# Patient Record
Sex: MALE | Race: WHITE | NOT HISPANIC OR LATINO | Employment: FULL TIME | URBAN - METROPOLITAN AREA
[De-identification: names, ages, dates, MRNs, and addresses within clinical notes are randomized per-mention and may not be internally consistent; named-entity substitution may affect disease eponyms.]

---

## 2020-04-09 ENCOUNTER — TELEMEDICINE (OUTPATIENT)
Dept: FAMILY MEDICINE CLINIC | Facility: CLINIC | Age: 25
End: 2020-04-09

## 2020-04-09 DIAGNOSIS — J30.1 CHRONIC ALLERGIC RHINITIS DUE TO POLLEN: Primary | ICD-10-CM

## 2020-04-09 DIAGNOSIS — R00.2 PALPITATIONS: ICD-10-CM

## 2020-04-09 PROCEDURE — 99202 OFFICE O/P NEW SF 15 MIN: CPT | Performed by: FAMILY MEDICINE

## 2020-04-09 NOTE — PROGRESS NOTES
" VIDEO VISIT  Riley Martel is a 25 y.o. male.     Chief Complaint   Patient presents with   • Allergies       HPI     Visit to establish care and with concern for cough and sore throat. Was initially concerned that he might have coronavirus, but has since convinced himself that he's suffering from allergic rhinitis. No fevers or chills, no fatigue or malaise. No known sick contacts. Began taking an OTC antihistamine with some improvement in symptoms. Still having some rhinorrhea and post-nasal drip, but less so, and sore throat is resolved.     Additionally notes some episodic palpitations that have begun to occur over the past several weeks. Typically happens once every few days, notices a few \"stronger beats\", and then won't again for a while. Hasn't identified any consistent precipitating factors. Episodes never last long enough to need to try to intervene. Father has similar episodes. They are unaffected by exercise.     The following portions of the patient's history were reviewed and updated as appropriate: allergies, current medications, past family history, past medical history, past social history, past surgical history and problem list.    Review of Systems   Constitutional: Negative for chills, fatigue, fever and unexpected weight change.   HENT: Positive for postnasal drip, rhinorrhea and sore throat.    Respiratory: Positive for cough. Negative for shortness of breath and wheezing.    Cardiovascular: Positive for palpitations. Negative for chest pain and leg swelling.   Gastrointestinal: Negative for abdominal distention, abdominal pain, constipation, diarrhea, nausea and vomiting.   Genitourinary: Negative for dysuria.   Musculoskeletal: Negative for arthralgias and myalgias.   Skin: Negative for rash.   Neurological: Negative for dizziness.   Psychiatric/Behavioral: Negative for confusion.         Objective  There were no vitals filed for this visit.  There is no height or weight on file to " calculate BMI.    Physical Exam   Constitutional: He is oriented to person, place, and time. He appears well-developed and well-nourished. No distress.   HENT:   Head: Normocephalic.   Right Ear: External ear normal.   Left Ear: External ear normal.   Nose: Nose normal.   Eyes: Pupils are equal, round, and reactive to light. Conjunctivae and EOM are normal.   Neurological: He is alert and oriented to person, place, and time.   Skin: Skin is dry.   Psychiatric: He has a normal mood and affect. His behavior is normal.       No current outpatient medications on file.  Current outpatient and discharge medications have been reconciled for the patient.  Reviewed by: Wilner Win MD      Procedures    Lab Results (most recent)     None                  Riley was seen today for allergies.    Diagnoses and all orders for this visit:    Chronic allergic rhinitis due to pollen    Palpitations    Discussed general strategy for management of allergic rhinitis. Can continue OTC antihistamines as desired, suggested he try adding a nasal steroid, which might be a better monotherapy. He'll try an OTC first, though I can prescribe it for him if he'd prefer.     Palpitations are likely PVCs. We discussed the etiologies of these, as well as they physiology. Encouraged him to both ignore them and monitor for any changes.     F/u as below, encouraged to keep in touch via [employer] in the meantime.     Any information loaded into the AVS was placed there by VILMA Win MD, and patient was counseled on the same.   Return in about 3 months (around 7/9/2020), or if symptoms worsen or fail to improve.      VILMA Win MD

## 2020-11-24 ENCOUNTER — OFFICE VISIT (OUTPATIENT)
Dept: FAMILY MEDICINE CLINIC | Facility: CLINIC | Age: 25
End: 2020-11-24

## 2020-11-24 VITALS
HEART RATE: 80 BPM | DIASTOLIC BLOOD PRESSURE: 72 MMHG | HEIGHT: 65 IN | RESPIRATION RATE: 16 BRPM | OXYGEN SATURATION: 99 % | SYSTOLIC BLOOD PRESSURE: 128 MMHG | BODY MASS INDEX: 26.16 KG/M2 | WEIGHT: 157 LBS | TEMPERATURE: 97 F

## 2020-11-24 DIAGNOSIS — Z00.00 ROUTINE HEALTH MAINTENANCE: Primary | ICD-10-CM

## 2020-11-24 DIAGNOSIS — J30.1 CHRONIC ALLERGIC RHINITIS DUE TO POLLEN: ICD-10-CM

## 2020-11-24 DIAGNOSIS — Z23 NEED FOR VACCINATION: ICD-10-CM

## 2020-11-24 DIAGNOSIS — B07.8 COMMON WART: ICD-10-CM

## 2020-11-24 PROCEDURE — 90715 TDAP VACCINE 7 YRS/> IM: CPT | Performed by: FAMILY MEDICINE

## 2020-11-24 PROCEDURE — 90471 IMMUNIZATION ADMIN: CPT | Performed by: FAMILY MEDICINE

## 2020-11-24 PROCEDURE — 99395 PREV VISIT EST AGE 18-39: CPT | Performed by: FAMILY MEDICINE

## 2020-11-24 PROCEDURE — 17000 DESTRUCT PREMALG LESION: CPT | Performed by: FAMILY MEDICINE

## 2020-11-24 PROCEDURE — 99213 OFFICE O/P EST LOW 20 MIN: CPT | Performed by: FAMILY MEDICINE

## 2020-11-24 RX ORDER — FLUTICASONE PROPIONATE 50 MCG
2 SPRAY, SUSPENSION (ML) NASAL DAILY
Qty: 18.2 ML | Refills: 5 | Status: SHIPPED | OUTPATIENT
Start: 2020-11-24

## 2020-11-24 NOTE — PROGRESS NOTES
Riley Martel is 25 y.o. and presents today for:     Chief Complaint   Patient presents with   • Annual Exam       HPI     Here today for general checkup and for follow-up on worsening allergies.  Overall considers himself fairly healthy, does have some chronic allergic rhinitis that was problematic in the spring and has become problematic again.  Has taken some over-the-counter antihistamine decongestant combo pills which have helped somewhat.  However still has a fair amount of nasal congestion and occasional chest congestion.  Has never tried a nasal steroid.  Is willing to do so.    Has noticed an outbreak of warts on his right hand.  Has interfered with his favorite past time of rockclimbing.  Has not been going to the [employer] [facility] recently due to the pandemic.  Has tried over-the-counter cryotherapy without much improvement.  Would like to have them frozen today if possible.    Otherwise up-to-date on recommended preventive maintenance.  Tries to stay physically active and eat a balanced diet.  Goes the dentist regularly.  Wears a seatbelt.  Has good familial and social support.    The following portions of the patient's history were reviewed and updated as appropriate: allergies, current medications, past family history, past medical history, past social history, past surgical history and problem list.    Review of Systems   Constitutional: Negative for chills, fatigue, fever and unexpected weight change.   HENT: Negative for sore throat.    Respiratory: Negative for cough, shortness of breath and wheezing.    Cardiovascular: Negative for chest pain, palpitations and leg swelling.   Gastrointestinal: Negative for abdominal distention, abdominal pain, constipation, diarrhea, nausea and vomiting.   Genitourinary: Negative for dysuria.   Musculoskeletal: Negative for arthralgias and myalgias.   Skin: Negative for rash.   Neurological: Negative for dizziness.   Psychiatric/Behavioral: Negative for  confusion.       Objective  Vitals:    11/24/20 1504   BP: 128/72   Pulse: 80   Resp: 16   Temp: 97 °F (36.1 °C)   SpO2: 99%     Body mass index is 26.13 kg/m².    Physical Exam  Vitals signs and nursing note reviewed.   Constitutional:       General: He is not in acute distress.     Appearance: Normal appearance. He is well-developed. He is not ill-appearing.   HENT:      Right Ear: Tympanic membrane, ear canal and external ear normal.      Left Ear: Tympanic membrane, ear canal and external ear normal.      Nose: Mucosal edema present.      Mouth/Throat:      Mouth: Mucous membranes are moist.      Pharynx: Oropharynx is clear.   Eyes:      Extraocular Movements: Extraocular movements intact.      Conjunctiva/sclera: Conjunctivae normal.      Pupils: Pupils are equal, round, and reactive to light.   Neck:      Musculoskeletal: Normal range of motion and neck supple.   Cardiovascular:      Rate and Rhythm: Normal rate and regular rhythm.      Pulses: Normal pulses.      Heart sounds: Normal heart sounds. No murmur.   Pulmonary:      Effort: Pulmonary effort is normal. No respiratory distress.      Breath sounds: Normal breath sounds. No wheezing.   Abdominal:      General: Bowel sounds are normal. There is no distension.      Palpations: Abdomen is soft.      Tenderness: There is no abdominal tenderness. There is no guarding or rebound.   Musculoskeletal: Normal range of motion.         General: No tenderness.   Skin:     General: Skin is warm and dry.      Comments: Multiple common warts on his right hand and fingers.   Neurological:      General: No focal deficit present.      Mental Status: He is alert and oriented to person, place, and time.      Sensory: No sensory deficit.   Psychiatric:         Mood and Affect: Mood normal.         Behavior: Behavior normal.           Current Outpatient Medications:   •  fluticasone (Flonase) 50 MCG/ACT nasal spray, 2 sprays into the nostril(s) as directed by provider  Daily., Disp: 18.2 mL, Rfl: 5  Current outpatient and discharge medications have been reconciled for the patient.  Reviewed by: Wilner Win MD      Procedures  Cryotherapy to the 4 wart(s) on R hand.  Informed consent obtained.   Expected skin reactions including erythema, pain, scabbing, blistering and hypopigmented scar formation were discussed.   Liquid nitrogen applied via cotton tipped swab in 3 x 30 second freeze thaw cycles.   Patient tolerated well, instructed as to proper after care.       Lab Results (most recent)     None                Diagnoses and all orders for this visit:    1. Routine health maintenance (Primary)    2. Chronic allergic rhinitis due to pollen  -     fluticasone (Flonase) 50 MCG/ACT nasal spray; 2 sprays into the nostril(s) as directed by provider Daily.  Dispense: 18.2 mL; Refill: 5    3. Common wart    4. Need for vaccination  -     Tdap Vaccine Greater Than or Equal To 8yo IM    Orders as above.  Tolerated cryotherapy to several warts today.  Will see if this helps resolve more than just those that were treated.  Can always follow-up for repeat treatments if he like.    Instructed as to proper use of the nasal steroid.  Will let me know how this works for him.    Vaccine as requested.    Follow-up as below.  Encouraged to communicate via [employer] in the meantime.    Any information loaded into the AVS was placed there by VILMA Win MD, and patient was counseled on the same.   Return in about 6 months (around 5/24/2021), or if symptoms worsen or fail to improve.      VILMA Win MD    Prevention counseling performed as below:  Healthy exercise.

## 2020-11-24 NOTE — PATIENT INSTRUCTIONS
Exercising to Stay Healthy  To become healthy and stay healthy, it is recommended that you do moderate-intensity and vigorous-intensity exercise. You can tell that you are exercising at a moderate intensity if your heart starts beating faster and you start breathing faster but can still hold a conversation. You can tell that you are exercising at a vigorous intensity if you are breathing much harder and faster and cannot hold a conversation while exercising.  Exercising regularly is important. It has many health benefits, such as:  · Improving overall fitness, flexibility, and endurance.  · Increasing bone density.  · Helping with weight control.  · Decreasing body fat.  · Increasing muscle strength.  · Reducing stress and tension.  · Improving overall health.  How often should I exercise?  Choose an activity that you enjoy, and set realistic goals. Your health care provider can help you make an activity plan that works for you.  Exercise regularly as told by your health care provider. This may include:  · Doing strength training two times a week, such as:  ? Lifting weights.  ? Using resistance bands.  ? Push-ups.  ? Sit-ups.  ? Yoga.  · Doing a certain intensity of exercise for a given amount of time. Choose from these options:  ? A total of 150 minutes of moderate-intensity exercise every week.  ? A total of 75 minutes of vigorous-intensity exercise every week.  ? A mix of moderate-intensity and vigorous-intensity exercise every week.  Children, pregnant women, people who have not exercised regularly, people who are overweight, and older adults may need to talk with a health care provider about what activities are safe to do. If you have a medical condition, be sure to talk with your health care provider before you start a new exercise program.  What are some exercise ideas?  Moderate-intensity exercise ideas include:  · Walking 1 mile (1.6 km) in about 15  minutes.  · Biking.  · Hiking.  · Golfing.  · Dancing.  · Water aerobics.  Vigorous-intensity exercise ideas include:  · Walking 4.5 miles (7.2 km) or more in about 1 hour.  · Jogging or running 5 miles (8 km) in about 1 hour.  · Biking 10 miles (16.1 km) or more in about 1 hour.  · Lap swimming.  · Roller-skating or in-line skating.  · Cross-country skiing.  · Vigorous competitive sports, such as football, basketball, and soccer.  · Jumping rope.  · Aerobic dancing.  What are some everyday activities that can help me to get exercise?  · Yard work, such as:  ? Pushing a .  ? Raking and bagging leaves.  · Washing your car.  · Pushing a stroller.  · Shoveling snow.  · Gardening.  · Washing windows or floors.  How can I be more active in my day-to-day activities?  · Use stairs instead of an elevator.  · Take a walk during your lunch break.  · If you drive, park your car farther away from your work or school.  · If you take public transportation, get off one stop early and walk the rest of the way.  · Stand up or walk around during all of your indoor phone calls.  · Get up, stretch, and walk around every 30 minutes throughout the day.  · Enjoy exercise with a friend. Support to continue exercising will help you keep a regular routine of activity.  What guidelines can I follow while exercising?  · Before you start a new exercise program, talk with your health care provider.  · Do not exercise so much that you hurt yourself, feel dizzy, or get very short of breath.  · Wear comfortable clothes and wear shoes with good support.  · Drink plenty of water while you exercise to prevent dehydration or heat stroke.  · Work out until your breathing and your heartbeat get faster.  Where to find more information  · [place] Department of Health and Human Services: www.hhs.gov  · Centers for Disease Control and Prevention (CDC): www.cdc.gov  Summary  · Exercising regularly is important. It will improve your overall fitness,  flexibility, and endurance.  · Regular exercise also will improve your overall health. It can help you control your weight, reduce stress, and improve your bone density.  · Do not exercise so much that you hurt yourself, feel dizzy, or get very short of breath.  · Before you start a new exercise program, talk with your health care provider.  This information is not intended to replace advice given to you by your health care provider. Make sure you discuss any questions you have with your health care provider.  Document Revised: 11/30/2018 Document Reviewed: 11/08/2018  Elsevier Patient Education © 2020 [employer]

## 2021-04-16 ENCOUNTER — BULK ORDERING (OUTPATIENT)
Dept: CASE MANAGEMENT | Facility: OTHER | Age: 26
End: 2021-04-16

## 2021-04-16 DIAGNOSIS — Z23 IMMUNIZATION DUE: ICD-10-CM

## 2022-10-11 ENCOUNTER — TELEPHONE (OUTPATIENT)
Dept: FAMILY MEDICINE CLINIC | Facility: CLINIC | Age: 27
End: 2022-10-11

## 2022-10-11 NOTE — TELEPHONE ENCOUNTER
Caller: Riley Martel    Relationship: Self    Best call back number: [phone]    Who are you requesting to speak with (clinical staff, provider,  specific staff member): DR BARRIGA OR MA    What was the call regarding: PATIENT STATES THAT HE IJURED HIS LEFT KNEE ABOUT 2 WEEKS AGO WHILE HIKING. HE HAS AN APPOINTMENT SCHEDULED ON 10/27/22, BUT REQUESTS A CALL BACK TO DISCUSS FURTHER CONCERNS AHEAD OF THAT APPOINTMENT    Do you require a callback: YES

## 2022-10-18 ENCOUNTER — OFFICE VISIT (OUTPATIENT)
Dept: FAMILY MEDICINE CLINIC | Facility: CLINIC | Age: 27
End: 2022-10-18

## 2022-10-18 VITALS
WEIGHT: 167 LBS | HEART RATE: 80 BPM | DIASTOLIC BLOOD PRESSURE: 78 MMHG | RESPIRATION RATE: 18 BRPM | BODY MASS INDEX: 27.79 KG/M2 | OXYGEN SATURATION: 99 % | SYSTOLIC BLOOD PRESSURE: 126 MMHG

## 2022-10-18 DIAGNOSIS — M25.562 LEFT LATERAL KNEE PAIN: Primary | ICD-10-CM

## 2022-10-18 PROCEDURE — 99213 OFFICE O/P EST LOW 20 MIN: CPT | Performed by: FAMILY MEDICINE

## 2022-10-18 NOTE — PROGRESS NOTES
Chief Complaint  Knee Injury (L knee x1 month ago )    Subjective    History of Present Illness {CC  Problem List  Visit  Diagnosis   Encounters  Notes  Medications  Labs  Result Review Imaging  Media :23}     Riley Martel presents to [facility] for Knee Injury (L knee x1 month ago ).  History of Present Illness     Here today with complaint of left knee pain for about a month now. Cannot recall any specific injury though it started hurting as he was hiking up and down a mountain in [place]. Now having trouble mostly with going downstairs. Feels unstable at times. No swelling or redness. No previous injuries. Wearing an over-the-counter neoprene sleeve with some improvement in function. Not taking any medications regularly.    Objective     Vital Signs:   /78   Pulse 80   Resp 18   Wt 75.8 kg (167 lb)   SpO2 99%   BMI 27.79 kg/m²   Physical Exam  Vitals and nursing note reviewed.   Constitutional:       General: He is not in acute distress.     Appearance: Normal appearance. He is not ill-appearing.   Musculoskeletal:      Right knee: Normal.      Left knee: No swelling, effusion or erythema. Normal range of motion. Tenderness present over the medial joint line.      Instability Tests: Anterior drawer test negative. Posterior drawer test negative. Anterior Lachman test negative.      Comments: Negative Thessaly B   Neurological:      Mental Status: He is alert.          Result Review  Data Reviewed:{ Labs  Result Review  Imaging  Med Tab  Media :23}                   Assessment and Plan {CC Problem List  Visit Diagnosis  ROS  Review (Popup)  Health Maintenance  Quality  BestPractice  Medications  SmartSets  SnapShot Encounters  Media :23}   Diagnoses and all orders for this visit:    1. Left lateral knee pain (Primary)    Discussed various potential etiologies for his knee pain. Sounds like potential patellofemoral syndrome versus some mild  arthritic changes or mild traumatic injury. Anticipate resolution with time. Gave him some home therapy to try. Could consider PT if needed.    Patient was given instructions and counseling regarding his condition or for health maintenance advice. Please see specific information pulled into the AVS (placed there by myself) if appropriate.    Return if symptoms worsen or fail to improve.      VILMA Win MD

## 2023-02-28 ENCOUNTER — OFFICE VISIT (OUTPATIENT)
Dept: FAMILY MEDICINE CLINIC | Facility: CLINIC | Age: 28
End: 2023-02-28
Payer: COMMERCIAL

## 2023-02-28 VITALS
RESPIRATION RATE: 18 BRPM | OXYGEN SATURATION: 99 % | HEART RATE: 82 BPM | BODY MASS INDEX: 28.29 KG/M2 | SYSTOLIC BLOOD PRESSURE: 132 MMHG | WEIGHT: 170 LBS | DIASTOLIC BLOOD PRESSURE: 78 MMHG

## 2023-02-28 DIAGNOSIS — R10.12 LUQ PAIN: Primary | ICD-10-CM

## 2023-02-28 PROCEDURE — 99213 OFFICE O/P EST LOW 20 MIN: CPT | Performed by: FAMILY MEDICINE

## 2023-02-28 NOTE — PROGRESS NOTES
Chief Complaint  Abdominal Pain (X1 week ) and Flank Pain    Subjective    History of Present Illness {CC  Problem List  Visit  Diagnosis   Encounters  Notes  Medications  Labs  Result Review Imaging  Media :23}     Riley Martel presents to [facility] for Abdominal Pain (X1 week ) and Flank Pain.  History of Present Illness     Here with complaints of some LUQ pain around the inferior anterior L rib cage. Worse with certain movements, can no longer sleep on the L side due to pain. Pain worse prior to meals, but otherwise not affected by food. BMs are normal, no blood in his stool, no diarrhea. Has had some slight pain relief with antacids. Was recently prescribed pantoprazole, took his first dose last night. Tylenol does not seem to offer much relief, slightly more so with ibuprofen. Cannot recall any specific inciting event. No previous episodes.    Objective     Vital Signs:   /78   Pulse 82   Resp 18   Wt 77.1 kg (170 lb)   SpO2 99%   BMI 28.29 kg/m²   Physical Exam  Vitals and nursing note reviewed.   Constitutional:       General: He is not in acute distress.     Appearance: Normal appearance. He is not ill-appearing.   Cardiovascular:      Rate and Rhythm: Normal rate and regular rhythm.      Pulses: Normal pulses.      Heart sounds: Normal heart sounds. No murmur heard.  Pulmonary:      Effort: Pulmonary effort is normal. No respiratory distress.      Breath sounds: Normal breath sounds. No rales.   Abdominal:      General: Abdomen is flat. Bowel sounds are normal. There is no distension.      Palpations: Abdomen is soft. There is no hepatomegaly or splenomegaly.      Tenderness: There is abdominal tenderness in the left upper quadrant. There is guarding. There is no rebound. Negative signs include Amato's sign and Rovsing's sign.      Hernia: No hernia is present.          Comments: Tender to light touch and deep palpation in the left upper quadrant,  around the inferior anterior rib margin and in the abdominal wall just below this.   Neurological:      Mental Status: He is alert and oriented to person, place, and time. Mental status is at baseline.   Psychiatric:         Mood and Affect: Mood normal.         Behavior: Behavior normal.          Result Review  Data Reviewed:{ Labs  Result Review  Imaging  Med Tab  Media :23}                   Assessment and Plan {CC Problem List  Visit Diagnosis  ROS  Review (Popup)  Health Maintenance  Quality  BestPractice  Medications  SmartSets  SnapShot Encounters  Media :23}   Diagnoses and all orders for this visit:    1. LUQ pain (Primary)    Unclear etiology though certainly seems to be some musculoskeletal component. Recommended increased doses of ibuprofen and some stretching. Anticipate resolution with time. Also think it be worth trying a higher dose PPI for a week or so as there may be some component of gastritis. He will keep me updated and follow-up as needed.    Patient was given instructions and counseling regarding his condition or for health maintenance advice. Please see specific information pulled into the AVS (placed there by myself) if appropriate.    Return if symptoms worsen or fail to improve.      VILMA Win MD

## 2023-03-07 ENCOUNTER — OFFICE VISIT (OUTPATIENT)
Dept: FAMILY MEDICINE CLINIC | Facility: CLINIC | Age: 28
End: 2023-03-07
Payer: COMMERCIAL

## 2023-03-07 ENCOUNTER — HOSPITAL ENCOUNTER (OUTPATIENT)
Dept: GENERAL RADIOLOGY | Facility: HOSPITAL | Age: 28
Discharge: HOME | End: 2023-03-07
Admitting: NURSE PRACTITIONER
Payer: COMMERCIAL

## 2023-03-07 VITALS
HEART RATE: 64 BPM | SYSTOLIC BLOOD PRESSURE: 138 MMHG | DIASTOLIC BLOOD PRESSURE: 88 MMHG | BODY MASS INDEX: 28.32 KG/M2 | WEIGHT: 170 LBS | OXYGEN SATURATION: 100 % | HEIGHT: 65 IN

## 2023-03-07 DIAGNOSIS — R10.12 ACUTE LUQ PAIN: ICD-10-CM

## 2023-03-07 DIAGNOSIS — R10.12 ACUTE LUQ PAIN: Primary | ICD-10-CM

## 2023-03-07 LAB
BILIRUB BLD-MCNC: NEGATIVE MG/DL
CLARITY, POC: CLEAR
COLOR UR: YELLOW
GLUCOSE UR STRIP-MCNC: NEGATIVE MG/DL
KETONES UR QL: NEGATIVE
LEUKOCYTE EST, POC: NEGATIVE
NITRITE UR-MCNC: NEGATIVE MG/ML
PH UR: 6 [PH] (ref 5–8)
PROT UR STRIP-MCNC: NEGATIVE MG/DL
RBC # UR STRIP: NEGATIVE /UL
SP GR UR: 1.01 (ref 1–1.03)
UROBILINOGEN UR QL: NORMAL

## 2023-03-07 PROCEDURE — 99213 OFFICE O/P EST LOW 20 MIN: CPT | Performed by: NURSE PRACTITIONER

## 2023-03-07 PROCEDURE — 74018 RADEX ABDOMEN 1 VIEW: CPT

## 2023-03-07 PROCEDURE — 81002 URINALYSIS NONAUTO W/O SCOPE: CPT | Performed by: NURSE PRACTITIONER

## 2023-03-07 NOTE — PROGRESS NOTES
Lionel Martel is a 28 y.o. male.     History of Present Illness   Dr Win patient. New to this provider. Acute visit    Patient returning for acute LUQ abdominal pain. Saw Urgent Care for stabbing pain sensation 2/27/23 and Dr DICKERSON 2/28/23. Complaints of LUQ discomfort x 3 weeks. He started pantoprazole 40mg  and bentyl 20 mg q8h 2/27/23. He describes pain now in LUQ /under ribs and moving to side and back/shoulder. No bowel changes, denies blood, diarrhea, no salvador colored stools. No changes w urine, no hx of kidney stone. No nausea, no vomiting, no fever. No cough, SOA. No GERD or heartburn. He is sleeping through the night. Went for a run yesterday and felt ok during run and afterward. He goes to rock climbing gym and has increased his activity but this was before pain started. Sometimes ibuprofen and tylenol help with pain. Last BM yesterday was normal.     The following portions of the patient's history were reviewed and updated as appropriate: allergies, current medications, past family history, past medical history, past social history, past surgical history and problem list.    Review of Systems    Objective   Physical Exam  Vitals reviewed.   Constitutional:       Appearance: Normal appearance.   HENT:      Mouth/Throat:      Mouth: Mucous membranes are moist.   Cardiovascular:      Rate and Rhythm: Normal rate and regular rhythm.      Pulses: Normal pulses.      Heart sounds: Normal heart sounds.   Pulmonary:      Effort: Pulmonary effort is normal.      Breath sounds: Normal breath sounds.   Abdominal:      General: Bowel sounds are normal. There is no distension.      Palpations: There is no splenomegaly or mass.      Tenderness: There is abdominal tenderness. There is guarding. There is no right CVA tenderness, left CVA tenderness or rebound.          Comments: Pt tense throughout   Musculoskeletal:         General: Normal range of motion.   Skin:     General: Skin is warm.    Neurological:      General: No focal deficit present.      Mental Status: He is alert.   Psychiatric:         Mood and Affect: Mood is anxious.         Vitals:    03/07/23 0811   BP: 138/88   Pulse: 64   SpO2: 100%     Body mass index is 28.29 kg/m².    Procedures    Assessment & Plan   Problems Addressed this Visit    None  Visit Diagnoses     Acute LUQ pain    -  Primary    Relevant Orders    Comprehensive Metabolic Panel    CBC & Differential    Lipid Panel With / Chol / HDL Ratio    Lipase    Urinalysis With Culture If Indicated -    XR Abdomen KUB      Diagnoses       Codes Comments    Acute LUQ pain    -  Primary ICD-10-CM: R10.12  ICD-9-CM: 789.02, 338.19         Abdominal pain-check urine, no hematuria or signs of infection. Check labs, send for KUB.  Continue with Bentyl and pantoprazole 40 mg.  Follow gentle diet, may take Gas-X as needed, may try heat, ice, topical rubs, limit intense exercise.  Alternate Tylenol and ibuprofen as needed.  ER for severe worsening pain or fever         Education provided in AVS   Return if symptoms worsen or fail to improve.

## 2023-03-08 LAB
ALBUMIN SERPL-MCNC: 5.4 G/DL (ref 3.5–5.2)
ALBUMIN/GLOB SERPL: 2.2 G/DL
ALP SERPL-CCNC: 61 U/L (ref 39–117)
ALT SERPL-CCNC: 22 U/L (ref 1–41)
AST SERPL-CCNC: 20 U/L (ref 1–40)
BASOPHILS # BLD AUTO: 0.05 10*3/MM3 (ref 0–0.2)
BASOPHILS NFR BLD AUTO: 0.7 % (ref 0–1.5)
BILIRUB SERPL-MCNC: 0.4 MG/DL (ref 0–1.2)
BUN SERPL-MCNC: 15 MG/DL (ref 6–20)
BUN/CREAT SERPL: 14.9 (ref 7–25)
CALCIUM SERPL-MCNC: 10.1 MG/DL (ref 8.6–10.5)
CHLORIDE SERPL-SCNC: 101 MMOL/L (ref 98–107)
CHOLEST SERPL-MCNC: 185 MG/DL (ref 0–200)
CHOLEST/HDLC SERPL: 3.94 {RATIO}
CO2 SERPL-SCNC: 29.4 MMOL/L (ref 22–29)
CREAT SERPL-MCNC: 1.01 MG/DL (ref 0.76–1.27)
EGFRCR SERPLBLD CKD-EPI 2021: 104.5 ML/MIN/1.73
EOSINOPHIL # BLD AUTO: 0.2 10*3/MM3 (ref 0–0.4)
EOSINOPHIL NFR BLD AUTO: 2.9 % (ref 0.3–6.2)
ERYTHROCYTE [DISTWIDTH] IN BLOOD BY AUTOMATED COUNT: 12.2 % (ref 12.3–15.4)
GLOBULIN SER CALC-MCNC: 2.5 GM/DL
GLUCOSE SERPL-MCNC: 100 MG/DL (ref 65–99)
HCT VFR BLD AUTO: 43.7 % (ref 37.5–51)
HDLC SERPL-MCNC: 47 MG/DL (ref 40–60)
HGB BLD-MCNC: 15 G/DL (ref 13–17.7)
IMM GRANULOCYTES # BLD AUTO: 0.02 10*3/MM3 (ref 0–0.05)
IMM GRANULOCYTES NFR BLD AUTO: 0.3 % (ref 0–0.5)
LDLC SERPL CALC-MCNC: 111 MG/DL (ref 0–100)
LIPASE SERPL-CCNC: 29 U/L (ref 13–60)
LYMPHOCYTES # BLD AUTO: 1.93 10*3/MM3 (ref 0.7–3.1)
LYMPHOCYTES NFR BLD AUTO: 27.6 % (ref 19.6–45.3)
MCH RBC QN AUTO: 30.1 PG (ref 26.6–33)
MCHC RBC AUTO-ENTMCNC: 34.3 G/DL (ref 31.5–35.7)
MCV RBC AUTO: 87.6 FL (ref 79–97)
MONOCYTES # BLD AUTO: 0.51 10*3/MM3 (ref 0.1–0.9)
MONOCYTES NFR BLD AUTO: 7.3 % (ref 5–12)
NEUTROPHILS # BLD AUTO: 4.29 10*3/MM3 (ref 1.7–7)
NEUTROPHILS NFR BLD AUTO: 61.2 % (ref 42.7–76)
NRBC BLD AUTO-RTO: 0 /100 WBC (ref 0–0.2)
PLATELET # BLD AUTO: 223 10*3/MM3 (ref 140–450)
POTASSIUM SERPL-SCNC: 4.3 MMOL/L (ref 3.5–5.2)
PROT SERPL-MCNC: 7.9 G/DL (ref 6–8.5)
RBC # BLD AUTO: 4.99 10*6/MM3 (ref 4.14–5.8)
SODIUM SERPL-SCNC: 140 MMOL/L (ref 136–145)
TRIGL SERPL-MCNC: 154 MG/DL (ref 0–150)
VLDLC SERPL CALC-MCNC: 27 MG/DL (ref 5–40)
WBC # BLD AUTO: 7 10*3/MM3 (ref 3.4–10.8)

## 2023-12-19 ENCOUNTER — OFFICE VISIT (OUTPATIENT)
Dept: FAMILY MEDICINE CLINIC | Facility: CLINIC | Age: 28
End: 2023-12-19
Payer: COMMERCIAL

## 2023-12-19 VITALS
SYSTOLIC BLOOD PRESSURE: 120 MMHG | BODY MASS INDEX: 28.32 KG/M2 | HEIGHT: 65 IN | RESPIRATION RATE: 18 BRPM | HEART RATE: 64 BPM | OXYGEN SATURATION: 99 % | DIASTOLIC BLOOD PRESSURE: 88 MMHG | WEIGHT: 170 LBS

## 2023-12-19 DIAGNOSIS — R10.12 LUQ PAIN: Primary | ICD-10-CM

## 2023-12-19 PROCEDURE — 99213 OFFICE O/P EST LOW 20 MIN: CPT | Performed by: FAMILY MEDICINE

## 2023-12-19 NOTE — PROGRESS NOTES
"Chief Complaint  LUQ pain    Subjective    History of Present Illness {CC  Problem List  Visit  Diagnosis   Encounters  Notes  Medications  Labs  Result Review Imaging  Media :23}     Riley Martel presents to [facility] for LUQ pain.  History of Present Illness     Here today with ongoing left upper quadrant pain. Was seen previously this year with similar symptoms. Has never really gone away. Seems worse after long periods of sitting at his desk. Occasionally tender to palpation. No change with bowel movements or eating. Feels like a squeezing sensation at times. Worse with bending towards the right or twisting. Has not been doing any sort of exercising recently.    Objective     Vital Signs:   /88   Pulse 64   Resp 18   Ht 165.1 cm (65\")   Wt 77.1 kg (170 lb)   SpO2 99%   BMI 28.29 kg/m²   Physical Exam  Vitals and nursing note reviewed.   Constitutional:       General: He is not in acute distress.     Appearance: Normal appearance. He is not ill-appearing.   Cardiovascular:      Rate and Rhythm: Normal rate and regular rhythm.      Pulses: Normal pulses.      Heart sounds: Normal heart sounds. No murmur heard.  Pulmonary:      Effort: Pulmonary effort is normal. No respiratory distress.      Breath sounds: Normal breath sounds. No rales.   Abdominal:      General: Abdomen is flat. Bowel sounds are normal. There is no distension.      Palpations: Abdomen is soft.      Tenderness: There is no abdominal tenderness. There is no guarding or rebound.   Musculoskeletal:      Lumbar back: Tenderness present.      Comments: Tenderness around the left flank, worse with rotation.   Neurological:      Mental Status: He is alert and oriented to person, place, and time. Mental status is at baseline.   Psychiatric:         Mood and Affect: Mood normal.         Behavior: Behavior normal.          Result Review  Data Reviewed:{ Labs  Result Review  Imaging  Med Tab  Media " :23}                   Assessment and Plan {CC Problem List  Visit Diagnosis  ROS  Review (Popup)  Health Maintenance  Quality  BestPractice  Medications  SmartSets  SnapShot Encounters  Media :23}   Diagnoses and all orders for this visit:    1. LUQ pain (Primary)    This is likely musculoskeletal and should improve with physical therapy and/or exercise. He would like to try exercises at the gym for now, will keep me updated with his progress. Happy to provide a referral to physical therapy as needed.    Recommended follow up as below. Encouraged communication via [employer] in the meantime.     Patient was given instructions and counseling regarding his condition or for health maintenance advice. Please see specific information pulled into the AVS (placed there by myself) if appropriate.    Return if symptoms worsen or fail to improve.    VILMA Win MD

## 2024-04-15 ENCOUNTER — OFFICE VISIT (OUTPATIENT)
Dept: FAMILY MEDICINE CLINIC | Facility: CLINIC | Age: 29
End: 2024-04-15
Payer: COMMERCIAL

## 2024-04-15 VITALS
WEIGHT: 166 LBS | HEIGHT: 65 IN | RESPIRATION RATE: 18 BRPM | DIASTOLIC BLOOD PRESSURE: 90 MMHG | HEART RATE: 81 BPM | OXYGEN SATURATION: 98 % | BODY MASS INDEX: 27.66 KG/M2 | SYSTOLIC BLOOD PRESSURE: 122 MMHG

## 2024-04-15 DIAGNOSIS — K21.9 GASTROESOPHAGEAL REFLUX DISEASE WITHOUT ESOPHAGITIS: ICD-10-CM

## 2024-04-15 DIAGNOSIS — R10.13 EPIGASTRIC ABDOMINAL PAIN: Primary | ICD-10-CM

## 2024-04-15 PROCEDURE — 99214 OFFICE O/P EST MOD 30 MIN: CPT | Performed by: NURSE PRACTITIONER

## 2024-04-15 RX ORDER — OMEPRAZOLE 20 MG/1
20 CAPSULE, DELAYED RELEASE ORAL DAILY
Qty: 30 CAPSULE | Refills: 3 | Status: SHIPPED | OUTPATIENT
Start: 2024-04-15

## 2024-04-15 NOTE — PROGRESS NOTES
Subjective   Riley Martel is a 29 y.o. male.     History of Present Illness   Patient presents with c/o epigastric pain that started about 1 week ago. He reports that he ate avocados last week and had previous issues while eating avocado. He reports that he's taken pepto bismol, pepcid and gas relief medicine. He reports that pain feels a little like he is hungry. He denies any nausea or vomiting, constipation and diarrhea. He reports that pain is fairly consistent  Patient drinks a pot of coffee or more daily, encouraged him to decrease.     The following portions of the patient's history were reviewed and updated as appropriate: allergies, current medications, past family history, past medical history, past social history, past surgical history and problem list.    Review of Systems   Constitutional:  Negative for fever and unexpected weight loss.   Gastrointestinal:  Positive for abdominal pain (Epigastric). Negative for abdominal distention, anal bleeding, blood in stool, constipation, diarrhea, nausea, rectal pain, vomiting, GERD and indigestion.   Genitourinary:  Negative for dysuria, frequency and hematuria.   Musculoskeletal:  Negative for arthralgias and myalgias.       Objective   Physical Exam  Vitals and nursing note reviewed.   Constitutional:       Appearance: He is well-developed.   HENT:      Head: Normocephalic and atraumatic.   Eyes:      Conjunctiva/sclera: Conjunctivae normal.      Pupils: Pupils are equal, round, and reactive to light.   Neck:      Thyroid: No thyromegaly.   Cardiovascular:      Rate and Rhythm: Normal rate and regular rhythm.      Heart sounds: Normal heart sounds. No murmur heard.  Pulmonary:      Effort: Pulmonary effort is normal.      Breath sounds: Normal breath sounds.   Abdominal:      General: Bowel sounds are normal. There is no distension.      Palpations: Abdomen is soft. There is no mass.      Tenderness: There is abdominal tenderness (epigastric area). There is  no right CVA tenderness, left CVA tenderness, guarding or rebound.      Hernia: No hernia is present.   Musculoskeletal:      Cervical back: Normal range of motion and neck supple.   Lymphadenopathy:      Cervical: No cervical adenopathy.   Skin:     General: Skin is warm and dry.   Neurological:      Mental Status: He is alert and oriented to person, place, and time.   Psychiatric:         Behavior: Behavior normal.         Thought Content: Thought content normal.         Judgment: Judgment normal.         Vitals:    04/15/24 1310   BP: 122/90   Pulse: 81   Resp: 18   SpO2: 98%     Body mass index is 27.62 kg/m².      Procedures    Assessment & Plan   Problems Addressed this Visit    None  Visit Diagnoses       Epigastric abdominal pain    -  Primary    Relevant Medications    omeprazole (priLOSEC) 20 MG capsule    Other Relevant Orders    Ambulatory Referral to Gastroenterology    Gastroesophageal reflux disease without esophagitis        Relevant Medications    omeprazole (priLOSEC) 20 MG capsule    Other Relevant Orders    Ambulatory Referral to Gastroenterology          Diagnoses         Codes Comments    Epigastric abdominal pain    -  Primary ICD-10-CM: R10.13  ICD-9-CM: 789.06     Gastroesophageal reflux disease without esophagitis     ICD-10-CM: K21.9  ICD-9-CM: 530.81           Symptoms suggest GERD  Start omeprazole 20 mg 1p.o. QD  Patient information regarding GERD.          Return if symptoms worsen or fail to improve.  Answers submitted by the patient for this visit:  Primary Reason for Visit (Submitted on 4/14/2024)  What is the primary reason for your visit?: Abdominal Pain

## 2024-04-26 ENCOUNTER — TELEPHONE (OUTPATIENT)
Dept: GASTROENTEROLOGY | Facility: CLINIC | Age: 29
End: 2024-04-26
Payer: COMMERCIAL

## 2024-04-29 ENCOUNTER — TELEPHONE (OUTPATIENT)
Dept: GASTROENTEROLOGY | Facility: CLINIC | Age: 29
End: 2024-04-29
Payer: COMMERCIAL

## 2024-04-29 NOTE — TELEPHONE ENCOUNTER
Hub to relay and r/s  Called pt to r/s 8/6 appt due to Malgorzata not being in the office. Left vm to r/s.

## 2024-04-30 ENCOUNTER — TELEPHONE (OUTPATIENT)
Dept: GASTROENTEROLOGY | Facility: CLINIC | Age: 29
End: 2024-04-30
Payer: COMMERCIAL

## 2024-08-14 ENCOUNTER — OFFICE VISIT (OUTPATIENT)
Dept: GASTROENTEROLOGY | Facility: CLINIC | Age: 29
End: 2024-08-14
Payer: COMMERCIAL

## 2024-08-14 VITALS
SYSTOLIC BLOOD PRESSURE: 115 MMHG | WEIGHT: 162 LBS | HEART RATE: 74 BPM | TEMPERATURE: 98.2 F | BODY MASS INDEX: 26.03 KG/M2 | DIASTOLIC BLOOD PRESSURE: 73 MMHG | HEIGHT: 66 IN

## 2024-08-14 DIAGNOSIS — R10.10 UPPER ABDOMINAL PAIN: Primary | ICD-10-CM

## 2024-08-14 NOTE — PROGRESS NOTES
"Chief Complaint  Abdominal Pain    Subjective          History Of Present Illness:    Riley Martel is a  29 y.o. male patient of CHRISTO Rainey who presents as a new patient for evaluation of epigastric pain.    Patient will be established with myself and Dr. Stiles going forward.    Patient reports he was previously having abdominal pain in the upper abdomen. Primarily occurred after eating, specifically noticed with eating avocados.  He has now since stopped eating avocados and feels like he is rarely had the symptoms.  He feels like he had similar response to guacamole in the past.  Patient was on pantoprazole for a short period of time, he is unsure if that really helped or not.  Patient reports the pain is described as burning sensation. Patient denies any nausea, vomiting, weight loss, appetite. Patient denies diarrhea, constipation, blood in the stool. No frequent NSAID use,.     Patient reports occasional marijuana use. Patient is nonsmoker. Patient reports social drinking.     No family history of GI malignancy.    Objective   Vital Signs:   /73   Pulse 74   Temp 98.2 °F (36.8 °C) (Temporal)   Ht 167.6 cm (66\")   Wt 73.5 kg (162 lb)   BMI 26.15 kg/m²       Physical Exam  Vitals reviewed.   Constitutional:       General: He is not in acute distress.     Appearance: Normal appearance. He is not ill-appearing.   HENT:      Head: Normocephalic and atraumatic.      Nose: Nose normal.      Mouth/Throat:      Pharynx: Oropharynx is clear.   Eyes:      Conjunctiva/sclera: Conjunctivae normal.   Pulmonary:      Effort: Pulmonary effort is normal.   Abdominal:      General: There is no distension.      Palpations: Abdomen is soft. There is no mass.      Tenderness: There is no abdominal tenderness.   Musculoskeletal:         General: No swelling. Normal range of motion.      Cervical back: Normal range of motion.   Skin:     General: Skin is warm and dry.      Findings: No bruising or rash. "   Neurological:      General: No focal deficit present.      Mental Status: He is alert and oriented to person, place, and time.      Motor: No weakness.      Gait: Gait normal.   Psychiatric:         Mood and Affect: Mood normal.          Result Review :   The following data was reviewed by: Malgorzata Jarrett PA-C on 08/14/2024:            Assessment and Plan    Diagnoses and all orders for this visit:    1. Upper abdominal pain (Primary)  -     Celiac Comprehensive Panel  -     H. Pylori Breath Test - Breath, Lung; Future  -     Food Allergy Profile       Patient's symptoms have essentially resolved with food avoidance.  Will check celiac panel, H. pylori breath test, food allergy profile   Will hold off on upper endoscopy at this time due to patient's clinical improvement.    Follow Up   Return if symptoms worsen or fail to improve, for Malgorzata Oliver PA-C.    Dragon dictation used throughout this note.            Malgorzata Oliver PA-C   [facility]  [address] [place] [address]  Office: [phone]

## 2024-08-15 LAB
ENDOMYSIUM IGA SER QL: NEGATIVE
GLIADIN PEPTIDE IGA SER-ACNC: 8 UNITS (ref 0–19)
GLIADIN PEPTIDE IGG SER-ACNC: 3 UNITS (ref 0–19)
IGA SERPL-MCNC: 373 MG/DL (ref 90–386)
TTG IGA SER-ACNC: <2 U/ML (ref 0–3)
TTG IGG SER-ACNC: 2 U/ML (ref 0–5)

## 2024-08-18 LAB
CLAM IGE QN: <0.1 KU/L
CODFISH IGE QN: <0.1 KU/L
CONV CLASS DESCRIPTION: NORMAL
CORN IGE QN: <0.1 KU/L
COW MILK IGE QN: <0.1 KU/L
EGG WHITE IGE QN: <0.1 KU/L
PEANUT IGE QN: <0.1 KU/L
SCALLOP IGE QN: <0.1 KU/L
SESAME SEED IGE QN: <0.1 KU/L
SHRIMP IGE QN: <0.1 KU/L
SOYBEAN IGE QN: <0.1 KU/L
WALNUT IGE QN: <0.1 KU/L
WHEAT IGE QN: <0.1 KU/L

## 2024-08-21 ENCOUNTER — LAB (OUTPATIENT)
Dept: GASTROENTEROLOGY | Facility: CLINIC | Age: 29
End: 2024-08-21
Payer: COMMERCIAL

## 2024-08-22 LAB — UREA BREATH TEST QL: NEGATIVE
